# Patient Record
(demographics unavailable — no encounter records)

---

## 2024-11-01 NOTE — PLAN
[FreeTextEntry1] : All problems, medications and allergies were assessed and reviewed with the patient. The patients' blood was drawn in office and will be followed and evaluated for any issues. Patient was told to notify the office if any issues arise. Patient agreeable with plan  fasting  will go for US thyroid

## 2024-11-01 NOTE — HISTORY OF PRESENT ILLNESS
[FreeTextEntry1] : follow up / med renewal  [de-identified] : 11/01/2024   57 year old female   presents for follow up  C/o:  PMH: Erythema nodusum-,follows with Rheum , anemia,  EN was getting bad - trialed pred and paquenil -  then back on dapsone- and now colchicine-  off dapsone for a week  pred taper - started on 10/30 gained weight on prednisone  -- [facility] and covid booster- UTD   Reviewed labs: from Rheum   dad: 82 has been in and out of hospital   allergies: PCN meds: statin 20, asa 81, lt4 100, , montelukast, ozempic supplements: multiv, iron   04/17/2024   56 year old female   presents for follow up  C/o:    had Rheum - follow up - for anemia due to Dapzone - Macrocytic anemia  was taken off Dapzone- has been  3/12/2024  due to anemia symptoms  - dad has been sick and has been stressed= with some IBS- D  - recurrent sinus infections- seeing ENT - is due for CT   09/29/2023   56 year old female   presents for follow up  C/o:  Erythema nodusum- had recent flare - now on back -  flare up - was on prednisone-  following up with Rheum - may have to increase dapsone   allergies: PCN meds: statin 20, asa 81, lt4 100, dapsone, montelukast, ozempic  supplements: multiv, iron  urinating okay   continue asa, lt4, montelukast  will hold atorvastatin    6/7:   56 year female   presents for follow up  C/o: had labwork done with Rheum -  labs reviewed thy labs wnl- continue lt4 100  H&H stable- continuing on iron   allergies: PCN meds: statin 20, asa 81, lt4 100, dapsone, montelukast, ozempic  supplements: multiv, iron   Denying any other concerns-    1/17: 55 yr old female presents for follow up c/o: still fatigued with  sinus congestion- states s/p antibiotics was slightly better but now experiencing symptoms again  denying fever, CP, SOB, or any other acute concerns  urinating and BMs normal- dark stool due to iron supplements-  as per Rheum : Dapzone 25 mg bid   [FreeTextEntry8] : Dec 19 2023  3:00PM   56 year  old female      presents for acute care visit via [facility]  Allergies: PCN  Meds: C/o: has covid symptoms starting since Friday getting  worse  fever , nasal congestion, hard dry cough, mild diarrhea- HOGAN , sinus pressure ,  tried mucinex, robitussin with minimal relief  Denying CP, SOB wheezing, or any other SYmptoms   continue asa, lt4, montelukast  will hold atorvastatin if starts paxlovid

## 2024-11-01 NOTE — PHYSICAL EXAM
[No Acute Distress] : no acute distress [Well Nourished] : well nourished [Well Developed] : well developed [Well-Appearing] : well-appearing [Thyroid Normal, No Nodules] : the thyroid was normal and there were no nodules present [No Accessory Muscle Use] : no accessory muscle use [No Respiratory Distress] : no respiratory distress  [Regular Rhythm] : with a regular rhythm [No Carotid Bruits] : no carotid bruits [No Abdominal Bruit] : a ~M bruit was not heard ~T in the abdomen [No Varicosities] : no varicosities [Pedal Pulses Present] : the pedal pulses are present [No Edema] : there was no peripheral edema [No Palpable Aorta] : no palpable aorta [No Extremity Clubbing/Cyanosis] : no extremity clubbing/cyanosis [Deep Tendon Reflexes (DTR)] : deep tendon reflexes were 2+ and symmetric [Normal] : affect was normal and insight and judgment were intact

## 2025-06-06 NOTE — PHYSICAL EXAM
[No Acute Distress] : no acute distress [Well Nourished] : well nourished [Well Developed] : well developed [Well-Appearing] : well-appearing [Thyroid Normal, No Nodules] : the thyroid was normal and there were no nodules present [No Respiratory Distress] : no respiratory distress  [No Accessory Muscle Use] : no accessory muscle use [Regular Rhythm] : with a regular rhythm [No Carotid Bruits] : no carotid bruits [No Abdominal Bruit] : a ~M bruit was not heard ~T in the abdomen [No Varicosities] : no varicosities [Pedal Pulses Present] : the pedal pulses are present [No Edema] : there was no peripheral edema [No Palpable Aorta] : no palpable aorta [No Extremity Clubbing/Cyanosis] : no extremity clubbing/cyanosis [Deep Tendon Reflexes (DTR)] : deep tendon reflexes were 2+ and symmetric [Normal] : affect was normal and insight and judgment were intact

## 2025-06-08 NOTE — REVIEW OF SYSTEMS
[Negative] : Psychiatric [Insomnia] : insomnia [Suicidal] : not suicidal [Anxiety] : no anxiety [Depression] : no depression [de-identified] : due to nasal congestion - trying azelastine- Grieving dad- has support-

## 2025-06-08 NOTE — PLAN
[FreeTextEntry1] : All problems, medications and allergies were assessed and reviewed with the patient. The patients' blood was drawn in office and will be followed and evaluated for any issues. Patient was told to notify the office if any issues arise. Patient agreeable with plan  fasting  will go for US thyroid  will re discuss sleep study after sinus procedure

## 2025-06-08 NOTE — REVIEW OF SYSTEMS
[Negative] : Psychiatric [Insomnia] : insomnia [Suicidal] : not suicidal [Anxiety] : no anxiety [Depression] : no depression [de-identified] : due to nasal congestion - trying azelastine- Grieving dad- has support-

## 2025-06-08 NOTE — HISTORY OF PRESENT ILLNESS
[FreeTextEntry1] : follow up / med renewal  [de-identified] : 06/06/2025   58 year old female   presents for follow up  C/o:  lost dad in December-grieving- emotional support provided - states has good support at home  saw ENT Dr Andie Cotto - needs sinus surgery  feels that she hurt her hernia- saw GI - Dr Higginbotham had MRI notable for splenic lesion but further imaging - benign  noted large ventral hernia - going for repeat MRI in [place]   on Linz[place]- due to IBS -  snoring- following with ENT  saw rheum - with flare ups- prednisone -  PMH: Erythema nodusum-,follows with Rheum , anemia,  allergies: PCN meds: statin 20, asa 81, lt4 100, , montelukast, ozempic meloxicam 15 mg prn -  prednisone taper-  supplements: multiv, iron    11/01/2024   57 year old female   presents for follow up  C/o:  PMH: Erythema nodusum-,follows with Rheum , anemia,  EN was getting bad - trialed pred and paquenil -  then back on dapsone- and now colchicine-  off dapsone for a week  pred taper - started on 10/30 gained weight on prednisone  -- [facility]- UTD   Reviewed labs: from Rheum   dad: 82 has been in and out of hospital   allergies: PCN meds: statin 20, asa 81, lt4 100, , montelukast, ozempic supplements: multiv, iron   04/17/2024   56 year old female   presents for follow up  C/o:    had Rheum - follow up - for anemia due to Dapzone - Macrocytic anemia  was taken off Dapzone- has been  3/12/2024  due to anemia symptoms  - dad has been sick and has been stressed= with some IBS- D  - recurrent sinus infections- seeing ENT - is due for CT   09/29/2023   56 year old female   presents for follow up  C/o:  Erythema nodusum- had recent flare - now on back -  flare up - was on prednisone-  following up with Rheum - may have to increase dapsone   allergies: PCN meds: statin 20, asa 81, lt4 100, dapsone, montelukast, ozempic  supplements: multiv, iron  urinating okay   continue asa, lt4, montelukast  will hold atorvastatin    6/7:   56 year female   presents for follow up  C/o: had labwork done with Rheum -  labs reviewed thy labs wnl- continue lt4 100  H&H stable- continuing on iron   allergies: PCN meds: statin 20, asa 81, lt4 100, dapsone, montelukast, ozempic  supplements: multiv, iron   Denying any other concerns-    1/17: 55 yr old female presents for follow up c/o: still fatigued with  sinus congestion- states s/p antibiotics was slightly better but now experiencing symptoms again  denying fever, CP, SOB, or any other acute concerns  urinating and BMs normal- dark stool due to iron supplements-  as per Rheum : Dapzone 25 mg bid   [FreeTextEntry8] : Dec 19 2023  3:00PM   56 year  old female      presents for acute care visit via TE[facility]  Allergies: PCN  Meds: C/o: has covid symptoms starting since Friday getting  worse  fever , nasal congestion, hard dry cough, mild diarrhea- HOGAN , sinus pressure ,  tried mucinex, robitussin with minimal relief  Denying CP, SOB wheezing, or any other SYmptoms   continue asa, lt4, montelukast  will hold atorvastatin if starts paxlovid

## 2025-06-08 NOTE — HISTORY OF PRESENT ILLNESS
[FreeTextEntry1] : follow up / med renewal  [de-identified] : 06/06/2025   58 year old female   presents for follow up  C/o:  lost dad in December-grieving- emotional support provided - states has good support at home  saw ENT Dr Andie Cotto - needs sinus surgery  feels that she hurt her hernia- saw GI - Dr Higginbotham had MRI notable for splenic lesion but further imaging - benign  noted large ventral hernia - going for repeat MRI in [place]   on Linzess- due to IBS -  snoring- following with ENT  saw rheum - with flare ups- prednisone -  PMH: Erythema nodusum-,follows with Rheum , anemia,  allergies: PCN meds: statin 20, asa 81, lt4 100, , montelukast, ozempic meloxicam 15 mg prn -  prednisone taper-  supplements: multiv, iron    11/01/2024   57 year old female   presents for follow up  C/o:  PMH: Erythema nodusum-,follows with Rheum , anemia,  EN was getting bad - trialed pred and paquenil -  then back on dapsone- and now colchicine-  off dapsone for a week  pred taper - started on 10/30 gained weight on prednisone  -- [facility]- UTD   Reviewed labs: from Rheum   dad: 82 has been in and out of hospital   allergies: PCN meds: statin 20, asa 81, lt4 100, , montelukast, ozempic supplements: multiv, iron   04/17/2024   56 year old female   presents for follow up  C/o:    had Rheum - follow up - for anemia due to Dapzone - Macrocytic anemia  was taken off Dapzone- has been  3/12/2024  due to anemia symptoms  - dad has been sick and has been stressed= with some IBS- D  - recurrent sinus infections- seeing ENT - is due for CT   09/29/2023   56 year old female   presents for follow up  C/o:  Erythema nodusum- had recent flare - now on back -  flare up - was on prednisone-  following up with Rheum - may have to increase dapsone   allergies: PCN meds: statin 20, asa 81, lt4 100, dapsone, montelukast, ozempic  supplements: multiv, iron  urinating okay   continue asa, lt4, montelukast  will hold atorvastatin    6/7:   56 year female   presents for follow up  C/o: had labwork done with Rheum -  labs reviewed thy labs wnl- continue lt4 100  H&H stable- continuing on iron   allergies: PCN meds: statin 20, asa 81, lt4 100, dapsone, montelukast, ozempic  supplements: multiv, iron   Denying any other concerns-    1/17: 55 yr old female presents for follow up c/o: still fatigued with  sinus congestion- states s/p antibiotics was slightly better but now experiencing symptoms again  denying fever, CP, SOB, or any other acute concerns  urinating and BMs normal- dark stool due to iron supplements-  as per Rheum : Dapzone 25 mg bid   [FreeTextEntry8] : Dec 19 2023  3:00PM   56 year  old female      presents for acute care visit via TE[facility]  Allergies: PCN  Meds: C/o: has covid symptoms starting since Friday getting  worse  fever , nasal congestion, hard dry cough, mild diarrhea- HOGAN , sinus pressure ,  tried mucinex, robitussin with minimal relief  Denying CP, SOB wheezing, or any other SYmptoms   continue asa, lt4, montelukast  will hold atorvastatin if starts paxlovid